# Patient Record
Sex: FEMALE | Race: WHITE | NOT HISPANIC OR LATINO | URBAN - METROPOLITAN AREA
[De-identification: names, ages, dates, MRNs, and addresses within clinical notes are randomized per-mention and may not be internally consistent; named-entity substitution may affect disease eponyms.]

---

## 2019-04-11 ENCOUNTER — OFFICE (OUTPATIENT)
Dept: URBAN - METROPOLITAN AREA CLINIC 66 | Facility: CLINIC | Age: 54
End: 2019-04-11

## 2019-04-11 VITALS — SYSTOLIC BLOOD PRESSURE: 160 MMHG | DIASTOLIC BLOOD PRESSURE: 100 MMHG | WEIGHT: 142 LBS | HEIGHT: 64 IN

## 2019-04-11 DIAGNOSIS — Z12.11 SCREENING FOR COLON CANCER: ICD-10-CM

## 2019-04-11 DIAGNOSIS — R10.31 ABDOMINAL PAIN RLQ: ICD-10-CM

## 2019-04-11 DIAGNOSIS — K21.9 GASTROESOPHAGEAL REFLUX DISEASE: ICD-10-CM

## 2019-04-11 PROCEDURE — 99203 OFFICE O/P NEW LOW 30 MIN: CPT | Performed by: INTERNAL MEDICINE

## 2019-04-11 NOTE — SERVICEHPINOTES
The patient is a yaneth 52 yo female referred by Dr. Justice for evaluation of recurrent right lower quadrant pain. In May of 2013 she began to notice a dull ache in the right lower quadrant, and assumed that this was related to her menstrual periods. She had a pelvic ultrasound which showed a cyst in the right ovary. The patient was then put on the birth control pill, which she took for 2 months. Unfortunately the pain was becoming more persistent, happening on a daily basis. Due to severe, stabbing discomfort she went for a laparoscopy in September 2014. The laparoscopy was normal, the appendix appeared normal yet was described as being elongated. There were questionable inflammatory changes of the cecum, though the gynecologist felt this was an equivocal finding. Follow up colonoscopy found right sided diverticulosis.  She had a marked exacerbation two weeks ago at [facility] and labs and a CT scan done in late March 2019 and distal small bowel inflammatory changes were noted--she is now back to normal, and is here for further evaluation.

## 2019-10-04 ENCOUNTER — AMBULATORY SURGICAL CENTER (OUTPATIENT)
Dept: URBAN - METROPOLITAN AREA SURGERY 42 | Facility: SURGERY | Age: 54
End: 2019-10-04

## 2019-10-04 VITALS
OXYGEN SATURATION: 98 % | HEIGHT: 64 IN | SYSTOLIC BLOOD PRESSURE: 153 MMHG | RESPIRATION RATE: 14 BRPM | DIASTOLIC BLOOD PRESSURE: 84 MMHG | HEART RATE: 62 BPM | TEMPERATURE: 98.1 F | WEIGHT: 130 LBS

## 2019-10-04 DIAGNOSIS — D12.3 BENIGN NEOPLASM OF TRANSVERSE COLON: ICD-10-CM

## 2019-10-04 DIAGNOSIS — K21.9 GASTRO-ESOPHAGEAL REFLUX DISEASE WITHOUT ESOPHAGITIS: ICD-10-CM

## 2019-10-04 DIAGNOSIS — K57.30 DVRTCLOS OF LG INT W/O PERFORATION OR ABSCESS W/O BLEEDING: ICD-10-CM

## 2019-10-04 PROBLEM — K29.70 GASTRITIS, UNSPECIFIED, WITHOUT BLEEDING: Status: ACTIVE | Noted: 2019-10-04

## 2019-10-04 LAB — SURGICAL: PDF REPORT: (no result)

## 2019-10-04 PROCEDURE — 43239 EGD BIOPSY SINGLE/MULTIPLE: CPT | Performed by: INTERNAL MEDICINE

## 2019-10-04 PROCEDURE — 45380 COLONOSCOPY AND BIOPSY: CPT | Performed by: INTERNAL MEDICINE

## 2019-10-04 NOTE — SERVICEHPINOTES
The patient is a yaneth 52 yo female referred by Dr. Justice for evaluation of recurrent right lower quadrant pain. In May of 2013 she began to notice a dull ache in the right lower quadrant, and assumed that this was related to her menstrual periods. She had a pelvic ultrasound which showed a cyst in the right ovary. The patient was then put on the birth control pill, which she took for 2 months. Unfortunately the pain was becoming more persistent, happening on a daily basis. Due to severe, stabbing discomfort she went for a laparoscopy in September 2014. The laparoscopy was normal, the appendix appeared normal yet was described as being elongated. There were questionable inflammatory changes of the cecum, though the gynecologist felt this was an equivocal finding. Follow up colonoscopy found right sided diverticulosis. She had a marked exacerbation two weeks ago at [facility] and labs and a CT scan done in late March 2019 and distal small bowel inflammatory changes were noted--she is now back to normal, and is here for further evaluation.